# Patient Record
Sex: FEMALE | Race: WHITE | Employment: FULL TIME | URBAN - METROPOLITAN AREA
[De-identification: names, ages, dates, MRNs, and addresses within clinical notes are randomized per-mention and may not be internally consistent; named-entity substitution may affect disease eponyms.]

---

## 2022-03-24 ENCOUNTER — HOSPITAL ENCOUNTER (EMERGENCY)
Age: 27
Discharge: HOME | End: 2022-03-24
Attending: EMERGENCY MEDICINE | Admitting: EMERGENCY MEDICINE
Payer: COMMERCIAL

## 2022-03-24 ENCOUNTER — APPOINTMENT (OUTPATIENT)
Dept: CT IMAGING | Age: 27
End: 2022-03-24
Attending: EMERGENCY MEDICINE
Payer: COMMERCIAL

## 2022-03-24 VITALS
SYSTOLIC BLOOD PRESSURE: 145 MMHG | OXYGEN SATURATION: 98 % | DIASTOLIC BLOOD PRESSURE: 90 MMHG | RESPIRATION RATE: 18 BRPM | HEIGHT: 69 IN | WEIGHT: 183 LBS | TEMPERATURE: 98.3 F | HEART RATE: 78 BPM | BODY MASS INDEX: 27.11 KG/M2

## 2022-03-24 DIAGNOSIS — S09.90XA CLOSED HEAD INJURY, INITIAL ENCOUNTER: Primary | ICD-10-CM

## 2022-03-24 PROCEDURE — 99282 EMERGENCY DEPT VISIT SF MDM: CPT

## 2022-03-24 NOTE — ED NOTES
FLORECITA Nelson reviewed discharge instructions with the patient. The patient verbalized understanding.

## 2022-03-24 NOTE — ED PROVIDER NOTES
Please note that this dictation was completed with [employer], the computer voice recognition software.  Quite often unanticipated grammatical, syntax, homophones, and other interpretive errors are inadvertently transcribed by the computer software.  Please disregard these errors.  Please excuse any errors that have escaped final proofreading. Patient is a 51-year-old otherwise healthy female presenting to ED for evaluation of head injury. She states that 2 hours prior to arrival she was bending forward and upon standing hit her head on the edge of a cabinet. Denies loss of consciousness. States that she felt immediate pain at the area of impact and has since noticed a gradual onset headache. Has not taken any medications prior to arrival.  She denies vision changes, dizziness, neck pain, numbness, tingling, trouble walking, nausea, vomiting, weakness, or any other medical complaints at this time. Does not take anticoagulants. No past medical history on file. No past surgical history on file. No family history on file. Social History     Socioeconomic History    Marital status: Not on file     Spouse name: Not on file    Number of children: Not on file    Years of education: Not on file    Highest education level: Not on file   Occupational History    Not on file   Tobacco Use    Smoking status: Not on file    Smokeless tobacco: Not on file   Substance and Sexual Activity    Alcohol use: Not on file    Drug use: Not on file    Sexual activity: Not on file   Other Topics Concern    Not on file   Social History Narrative    Not on file     Social Determinants of Health     Financial Resource Strain:     Difficulty of Paying Living Expenses: Not on file   Food Insecurity:     Worried About Running Out of Food in the Last Year: Not on file    Asa of Food in the Last Year: Not on file   Transportation Needs:     Lack of Transportation (Medical):  Not on file    Lack of Transportation (Non-Medical): Not on file   Physical Activity:     Days of Exercise per Week: Not on file    Minutes of Exercise per Session: Not on file   Stress:     Feeling of Stress : Not on file   Social Connections:     Frequency of Communication with Friends and Family: Not on file    Frequency of Social Gatherings with Friends and Family: Not on file    Attends Hoahaoism Services: Not on file    Active Member of [address] [place] [address] or Organizations: Not on file    Attends Club or Organization Meetings: Not on file    Marital Status: Not on file   Intimate Partner Violence:     Fear of Current or Ex-Partner: Not on file    Emotionally Abused: Not on file    Physically Abused: Not on file    Sexually Abused: Not on file   Housing Stability:     Unable to Pay for Housing in the Last Year: Not on file    Number of [place] in the Last Year: Not on file    Unstable Housing in the Last Year: Not on file         ALLERGIES: Patient has no known allergies. Review of Systems   Constitutional: Negative for chills and fever. HENT: Negative for ear pain and sore throat. Eyes: Negative for visual disturbance. Respiratory: Negative for cough and shortness of breath. Cardiovascular: Negative for chest pain. Gastrointestinal: Negative for abdominal pain. Genitourinary: Negative for flank pain. Musculoskeletal: Negative for back pain. Skin: Negative for color change. Neurological: Positive for headaches. Negative for dizziness. Psychiatric/Behavioral: Negative for confusion. Vitals:    03/24/22 1512   BP: (!) 145/90   Pulse: 78   Resp: 18   Temp: 98.3 °F (36.8 °C)   SpO2: 98%   Weight: 83 kg (183 lb)   Height: 5' 9\" (1.753 m)            Physical Exam  Vitals and nursing note reviewed. Constitutional:       General: She is not in acute distress. Appearance: Normal appearance. She is not ill-appearing. HENT:      Head: Normocephalic and atraumatic. Jaw:  There is normal jaw occlusion. Right Ear: Tympanic membrane, ear canal and external ear normal. No hemotympanum. Left Ear: Tympanic membrane, ear canal and external ear normal. No hemotympanum. Eyes:      General: Vision grossly intact. No visual field deficit. Extraocular Movements: Extraocular movements intact. Conjunctiva/sclera: Conjunctivae normal.      Pupils: Pupils are equal, round, and reactive to light. Neck:      Trachea: Phonation normal.   Cardiovascular:      Rate and Rhythm: Normal rate and regular rhythm. Heart sounds: Normal heart sounds. Pulmonary:      Effort: Pulmonary effort is normal.      Breath sounds: Normal breath sounds and air entry. Abdominal:      Palpations: Abdomen is soft. Tenderness: There is no abdominal tenderness. Musculoskeletal:         General: Normal range of motion. Cervical back: Normal range of motion. Skin:     General: Skin is warm and dry. Neurological:      General: No focal deficit present. Mental Status: She is alert and oriented to person, place, and time. Cranial Nerves: Cranial nerves are intact. No cranial nerve deficit, dysarthria or facial asymmetry. Sensory: Sensation is intact. Motor: Motor function is intact. No tremor or pronator drift. Coordination: Coordination is intact. Finger-Nose-Finger Test and Heel to [facility] Test normal. Rapid alternating movements normal.      Gait: Gait is intact. Psychiatric:         Attention and Perception: Attention normal.         Mood and Affect: Mood normal.         Speech: Speech normal.          MDM  Number of Diagnoses or Management Options  Closed head injury, initial encounter  Diagnosis management comments: Patient is alert, afebrile, vitals stable. Presents ambulatory after a head injury. Low mechanism of injury, no anticoagulants.   She is well-appearing and without external signs of skull fracture or focal neurologic deficits on exam.  0 points on Gillespie head CT rule. I feel that the risks of CT scan imaging outweigh the benefits at this time. Discussed reassuring exam findings with patient, recommend physical and mental rest and follow-up with her PCP as needed. Return precautions outlined to return to ED with any clinical changes, return precautions outlined. All questions answered at this time. 3:47 PM  Pt has been reevaluated. There are no new complaints, changes, or physical findings at this time. All results have been reviewed with patient and/or family. Medications have been reviewed w/ pt and/or family. Pt and/or family's questions have been answered. Pt and/or family expressed good understanding of the dx/tx/rx and is in agreement with plan of care. Pt instructed and agreed to f/u w/ PCP and to return to ED upon further deterioration. Pt is ready for discharge. IMPRESSION:  1. Closed head injury, initial encounter        PLAN:  1. There are no discharge medications for this patient.     2.   Follow-up Information     Follow up With Specialties Details Why Contact Cruz Frederick  Schedule an appointment as soon as possible for a visit   [address] [phone]            Return to ED if worse          Procedures

## 2022-03-24 NOTE — DISCHARGE INSTRUCTIONS
Since you have no primary care provider listed we will refer you to [facility]. They are a part of the [employer]. They will have access to results of labs and imaging you had done here in the Emergency Room. Tylenol 500 mg alternating with Ibuprofen 600mg every 6-8 hours for pain, fever and/or body aches. Example: 8am take Ibuprofen. 11am take tylenol. 2pm take ibuprofen. 5pm take tylenol. 8pm take ibuprofen. 11pm take tylenol. You may continue this process overnight if you have continued pain/discomfort. Do not take over 3,000 mg of Tylenol in 24 hours.

## 2022-03-24 NOTE — Clinical Note
[address]  [address] [facility] [id number]  [phone]    Work/School Note    Date: 3/24/2022    To Whom It May concern:    Haile Finney was seen and treated today in the emergency room by the following provider(s):  Attending Provider: Franki Nino MD  Physician Assistant: FLORECITA Brown. Haile Finney is excused from work/school on 03/24/22 and 03/25/22. She is medically clear to return to work/school on 3/26/2022.        Sincerely,          FLORECITA Amor

## 2022-03-24 NOTE — ED TRIAGE NOTES
Pt arrives ambulatory to triage after she hit her head on a cabinet. Pt states she bent down and when she stood up she hit her head on a cabinet above her. No LOC, no blood thinners.